# Patient Record
Sex: FEMALE | Race: WHITE | URBAN - METROPOLITAN AREA
[De-identification: names, ages, dates, MRNs, and addresses within clinical notes are randomized per-mention and may not be internally consistent; named-entity substitution may affect disease eponyms.]

---

## 2017-05-18 ENCOUNTER — APPOINTMENT (RX ONLY)
Dept: URBAN - METROPOLITAN AREA CLINIC 80 | Facility: CLINIC | Age: 22
Setting detail: DERMATOLOGY
End: 2017-05-18

## 2017-05-18 VITALS — WEIGHT: 140 LBS | HEIGHT: 55 IN

## 2017-05-18 DIAGNOSIS — L70.0 ACNE VULGARIS: ICD-10-CM

## 2017-05-18 DIAGNOSIS — L30.4 ERYTHEMA INTERTRIGO: ICD-10-CM

## 2017-05-18 DIAGNOSIS — D22 MELANOCYTIC NEVI: ICD-10-CM

## 2017-05-18 DIAGNOSIS — Z71.89 OTHER SPECIFIED COUNSELING: ICD-10-CM

## 2017-05-18 PROBLEM — D22.5 MELANOCYTIC NEVI OF TRUNK: Status: ACTIVE | Noted: 2017-05-18

## 2017-05-18 PROBLEM — D22.62 MELANOCYTIC NEVI OF LEFT UPPER LIMB, INCLUDING SHOULDER: Status: ACTIVE | Noted: 2017-05-18

## 2017-05-18 PROCEDURE — ? TREATMENT REGIMEN

## 2017-05-18 PROCEDURE — ? PRESCRIPTION

## 2017-05-18 PROCEDURE — 99213 OFFICE O/P EST LOW 20 MIN: CPT

## 2017-05-18 PROCEDURE — ? COUNSELING

## 2017-05-18 RX ORDER — MINOCYCLINE HYDROCHLORIDE 65 MG/1
TABLET, FILM COATED, EXTENDED RELEASE ORAL
Qty: 30 | Refills: 2 | Status: ERX | COMMUNITY
Start: 2017-05-18

## 2017-05-18 RX ORDER — TAZAROTENE 1 MG/G
AEROSOL, FOAM TOPICAL
Qty: 1 | Refills: 2 | Status: ERX | COMMUNITY
Start: 2017-05-18

## 2017-05-18 RX ORDER — CLINDAMYCIN PHOSPHATE AND BENZOYL PEROXIDE 10; 37.5 MG/G; MG/G
GEL TOPICAL
Qty: 1 | Refills: 2 | Status: ERX | COMMUNITY
Start: 2017-05-18

## 2017-05-18 RX ORDER — SODIUM SULFACETAMIDE AND SULFUR 80; 40 MG/ML; MG/ML
LOTION TOPICAL
Qty: 2 | Refills: 2 | Status: ERX

## 2017-05-18 RX ADMIN — CLINDAMYCIN PHOSPHATE AND BENZOYL PEROXIDE: 10; 37.5 GEL TOPICAL at 20:07

## 2017-05-18 RX ADMIN — TAZAROTENE: 1 AEROSOL, FOAM TOPICAL at 20:05

## 2017-05-18 RX ADMIN — MINOCYCLINE HYDROCHLORIDE: 65 TABLET, FILM COATED, EXTENDED RELEASE ORAL at 20:07

## 2017-05-18 ASSESSMENT — LOCATION ZONE DERM
LOCATION ZONE: TRUNK
LOCATION ZONE: ARM
LOCATION ZONE: FACE

## 2017-05-18 ASSESSMENT — LOCATION SIMPLE DESCRIPTION DERM
LOCATION SIMPLE: LEFT FOREHEAD
LOCATION SIMPLE: CHEST
LOCATION SIMPLE: ABDOMEN
LOCATION SIMPLE: LEFT SHOULDER

## 2017-05-18 ASSESSMENT — LOCATION DETAILED DESCRIPTION DERM
LOCATION DETAILED: LOWER STERNUM
LOCATION DETAILED: LEFT POSTERIOR SHOULDER
LOCATION DETAILED: RIGHT LATERAL ABDOMEN
LOCATION DETAILED: LEFT INFERIOR MEDIAL FOREHEAD

## 2017-05-18 NOTE — PROCEDURE: TREATMENT REGIMEN
Discontinue Regimen: Finacea foam QD and Acticlate 150 mg QD
Plan: Patient is moving to [place] for school--will call for any concerns if she is unable to follow up before holidays.
Samples Given: Onexton
Detail Level: Zone
Initiate Treatment: Fabior Foam and Onexton
Continue Regimen: Sulfacleanse
Continue Regimen: Alcortin as needed

## 2017-12-20 RX ORDER — CLINDAMYCIN PHOSPHATE AND BENZOYL PEROXIDE 10; 37.5 MG/G; MG/G
GEL TOPICAL
Qty: 1 | Refills: 2 | Status: ERX

## 2018-02-02 ENCOUNTER — RX ONLY (OUTPATIENT)
Age: 23
Setting detail: RX ONLY
End: 2018-02-02

## 2018-02-02 RX ORDER — VALACYCLOVIR HYDROCHLORIDE 1 G/1
TABLET, FILM COATED ORAL
Qty: 4 | Refills: 0 | Status: ERX | COMMUNITY
Start: 2018-02-02

## 2018-05-25 RX ORDER — SODIUM SULFACETAMIDE AND SULFUR 80; 40 MG/ML; MG/ML
LOTION TOPICAL
Qty: 2 | Refills: 0 | Status: ERX

## 2018-05-25 RX ORDER — CLINDAMYCIN PHOSPHATE AND BENZOYL PEROXIDE 10; 37.5 MG/G; MG/G
GEL TOPICAL
Qty: 1 | Refills: 0 | Status: ERX

## 2019-05-31 PROBLEM — Z00.00 ENCOUNTER FOR PREVENTIVE HEALTH EXAMINATION: Status: ACTIVE | Noted: 2019-05-31

## 2019-06-03 ENCOUNTER — APPOINTMENT (OUTPATIENT)
Dept: OTOLARYNGOLOGY | Facility: CLINIC | Age: 24
End: 2019-06-03
Payer: COMMERCIAL

## 2019-06-03 VITALS
BODY MASS INDEX: 23.32 KG/M2 | HEART RATE: 95 BPM | DIASTOLIC BLOOD PRESSURE: 86 MMHG | WEIGHT: 140 LBS | HEIGHT: 65 IN | SYSTOLIC BLOOD PRESSURE: 127 MMHG

## 2019-06-03 DIAGNOSIS — J34.9 UNSPECIFIED DISORDER OF NOSE AND NASAL SINUSES: ICD-10-CM

## 2019-06-03 DIAGNOSIS — Z80.9 FAMILY HISTORY OF MALIGNANT NEOPLASM, UNSPECIFIED: ICD-10-CM

## 2019-06-03 DIAGNOSIS — Z87.09 PERSONAL HISTORY OF OTHER DISEASES OF THE RESPIRATORY SYSTEM: ICD-10-CM

## 2019-06-03 DIAGNOSIS — Z87.01 PERSONAL HISTORY OF PNEUMONIA (RECURRENT): ICD-10-CM

## 2019-06-03 DIAGNOSIS — J45.909 UNSPECIFIED ASTHMA, UNCOMPLICATED: ICD-10-CM

## 2019-06-03 PROCEDURE — 31231 NASAL ENDOSCOPY DX: CPT

## 2019-06-03 PROCEDURE — 99203 OFFICE O/P NEW LOW 30 MIN: CPT | Mod: 25

## 2019-06-03 NOTE — ASSESSMENT
[FreeTextEntry1] : She has a long history of chronic sinusitis. She has recurrent sinus infections. She has been sick almost every other month since September. She was sick twice in May. She is currently on antibiotics for another infection. She just finished a course of steroids. She had findings consistent with a sinus infection on nasal endoscopy. A culture was taken. She has allergies. She saw her allergist and started allergy evaluation.  She completed the testing for food allergies. 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 She does have mild erythema on the laryngeal surface of the epiglottis. She may also suffer from reflux. There is mild interarytenoid edema that suggests this. There were no vocal cord lesions.\par \par PLAN\par \par -findings and management options discussed in detail with the patient. \par -Nasal saline rinses and antihistamine/decongestant as needed\par -she was asked to try a nasal steroid spray again.\par -she was asked to call for the results of the culture in 5-7 days. Because she does not feel that the current antibiotic is helping, I am going to switch her to doxycycline. We may change the antibiotic depending on the culture results.\par -I have asked her to complete the allergy evaluation\par -I will send her for a CT scan of the sinuses. I would like the infection to improve first but will send her even if she is not better when she returns. \par -Reflux precautions and medication (OTC zantac) as needed\par -hydration, humidification \par -good vocal hygiene. \par -speech therapy evaluation since she is a singer. \par -I asked her to consider an audiogram because she complained of fullness in her ears. she deferred today\par -follow up 1-2 weeks. \par -call and return earlier if any concerns. \par

## 2019-06-03 NOTE — HISTORY OF PRESENT ILLNESS
[de-identified] : PAPO CHACON is a 24 year  patient with a history of chronic sinusitis.  She has been sick twice in the past month. She has a long history of recurrent sinus infections.  She says they have been getting worse.  She was treated for an infection with cefdinir in eary May.  She improved but then had recurrent symptoms last week. She was started on amoxicillin and prednisone. She said a strep test as needed.  She has a cough, nasal congestion, nasal obstruction.  She typically does not have a cough when she is sick. 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 She has voice change now. She has no throat pain or dysphagia. she denies reflux. she started allergy testing but only completed the testing for food allergies. She has tried nasal steroid sprays, decongestants and antihistamines. The medications are not helping much.  She finished the steroids yesterday. She was also on steroids about 2 weeks ago.\par \par History of frequent or recurrent sinus infections: this year, she has been sick every other month since September. She moved to [place] in September.  \par \par Allergy testing:  food allergies - soy, almonds\par Nasal/sinus surgery:  no\par Nasal trauma: no\par \par Pets at home: no\par Secondhand smoke exposure: no\par \par She had mono in the past. \par \par Sinus questionnaire was reviewed\par \par

## 2019-06-10 LAB — BACTERIA FLD CULT: NORMAL

## 2019-06-26 ENCOUNTER — APPOINTMENT (OUTPATIENT)
Dept: OTOLARYNGOLOGY | Facility: CLINIC | Age: 24
End: 2019-06-26
Payer: COMMERCIAL

## 2019-06-26 VITALS
DIASTOLIC BLOOD PRESSURE: 81 MMHG | SYSTOLIC BLOOD PRESSURE: 128 MMHG | BODY MASS INDEX: 23.32 KG/M2 | HEART RATE: 86 BPM | WEIGHT: 140 LBS | HEIGHT: 65 IN

## 2019-06-26 DIAGNOSIS — H93.293 OTHER ABNORMAL AUDITORY PERCEPTIONS, BILATERAL: ICD-10-CM

## 2019-06-26 DIAGNOSIS — M26.609 UNSPECIFIED TEMPOROMANDIBULAR JOINT DISORDER: ICD-10-CM

## 2019-06-26 PROCEDURE — 92557 COMPREHENSIVE HEARING TEST: CPT

## 2019-06-26 PROCEDURE — 99213 OFFICE O/P EST LOW 20 MIN: CPT

## 2019-06-26 PROCEDURE — 92567 TYMPANOMETRY: CPT

## 2019-06-26 RX ORDER — PSEUDOEPHEDRINE HCL 30 MG
TABLET ORAL
Refills: 0 | Status: DISCONTINUED | COMMUNITY
End: 2019-06-26

## 2019-06-26 RX ORDER — PREDNISONE 10 MG/1
10 TABLET ORAL
Qty: 12 | Refills: 0 | Status: DISCONTINUED | COMMUNITY
Start: 2019-06-07 | End: 2019-06-26

## 2019-06-26 RX ORDER — DOXYCYCLINE 100 MG/1
100 CAPSULE ORAL TWICE DAILY
Qty: 20 | Refills: 0 | Status: DISCONTINUED | COMMUNITY
Start: 2019-06-03 | End: 2019-06-26

## 2019-06-26 RX ORDER — AMOXICILLIN 875 MG/1
TABLET, FILM COATED ORAL
Refills: 0 | Status: DISCONTINUED | COMMUNITY
End: 2019-06-26

## 2019-06-26 RX ORDER — FEXOFENADINE HCL 60 MG
CAPSULE ORAL
Refills: 0 | Status: ACTIVE | COMMUNITY

## 2019-06-26 RX ORDER — NORETHINDRONE ACETATE AND ETHINYL ESTRADIOL, ETHINYL ESTRADIOL AND FERROUS FUMARATE 1MG-10(24)
1 MG-10 MCG / KIT ORAL
Refills: 0 | Status: ACTIVE | COMMUNITY

## 2019-06-26 RX ORDER — FLUTICASONE PROPIONATE 50 MCG
SPRAY, SUSPENSION NASAL
Refills: 0 | Status: ACTIVE | COMMUNITY

## 2019-06-26 NOTE — HISTORY OF PRESENT ILLNESS
[de-identified] : 6/3/19- PAOP CHACON is a 24 year  patient with a history of chronic sinusitis.  She has been sick twice in the past month. She has a long history of recurrent sinus infections.  She says they have been getting worse.  She was treated for an infection with cefdinir in eary May.  She improved but then had recurrent symptoms last week. She was started on amoxicillin and prednisone. She said a strep test as needed.  She has a cough, nasal congestion, nasal obstruction.  She typically does not have a cough when she is sick. 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 She has voice change now. She has no throat pain or dysphagia. she denies reflux. she started allergy testing but only completed the testing for food allergies. She has tried nasal steroid sprays, decongestants and antihistamines. The medications are not helping much.  She finished the steroids yesterday. She was also on steroids about 2 weeks ago.\par \par History of frequent or recurrent sinus infections: this year, she has been sick every other month since September. She moved to [place] in September.  \par \par Allergy testing:  food allergies - soy, almonds\par Nasal/sinus surgery:  no\par Nasal trauma: no\par \par Pets at home: no\par Secondhand smoke exposure: no\par \par She had mono in the past. \par \par Sinus questionnaire was reviewed\par \par  [FreeTextEntry1] : \par \par 6/26/19- Jen Gregory is here for follow up for chronic sinusitis, recurrent infections, allergies and reflux. She is feeling better.  Culture taken at her last visit was negative.  She has mild occasional frontal sinus pressure/pain and dental pain.  She does not have nasal obstruction or nasal drainage.  She has not finished the allergy evaluation yet. She had the CT scan of her sinuses.  The scan showed narrow but patent OMCs, right middle turbinate jesse bullosa and deviated nasal septum.  There was no significant sinus inflammation. She has eustachian tube dysfunction and ear pressure. She may have TMJD

## 2019-06-26 NOTE — ASSESSMENT
[FreeTextEntry1] : She has a history of chronic sinusitis and recurrent sinus infections. She has been feeling better. Review of her CT scan could she does have a narrow long sneeze, a deviated septum, and right middle turbinate jesse bullosa.\par \par She has reflux.\par \par She has TMJ dysfunction\par \par She has eustachian tube dysfunction. Her exam and audiogram are normal\par \par PLAN\par \par -findings and management options discussed in detail with the patient. \par -Nasal saline rinses and antihistamine/decongestant as needed\par -nasal steroid spray prn\par -allergy evaluation\par -We will continue to monitor her. I would like to see her when she has a sinus infection. She could consider balloon sinuplasty and possible nasal procedures depending on how she does.\par -Reflux precautions and medication (OTC zantac) as needed\par -good vocal hygiene. \par -speech therapy evaluation  \par -good aural hygiene and noise precautions\par -annual audiogram prn\par -treatment for TMJD recommended. She was given literature\par -follow up in 3 months if she is doing well.\par -call and return earlier if any concerns or she has an infection. \par

## 2019-07-31 ENCOUNTER — APPOINTMENT (OUTPATIENT)
Dept: OTOLARYNGOLOGY | Facility: CLINIC | Age: 24
End: 2019-07-31
Payer: COMMERCIAL

## 2019-07-31 VITALS
BODY MASS INDEX: 23.32 KG/M2 | WEIGHT: 140 LBS | HEART RATE: 102 BPM | SYSTOLIC BLOOD PRESSURE: 134 MMHG | DIASTOLIC BLOOD PRESSURE: 90 MMHG | HEIGHT: 65 IN

## 2019-07-31 PROCEDURE — 99213 OFFICE O/P EST LOW 20 MIN: CPT | Mod: 25

## 2019-07-31 PROCEDURE — 31575 DIAGNOSTIC LARYNGOSCOPY: CPT

## 2019-07-31 NOTE — ASSESSMENT
[FreeTextEntry1] : She does have recurrent laryngitis and hoarseness. This is likely due to reflux and vocal strain. She has had high vocal load. She did feel better when she rested her voice. She also felt better on Prilosec. There were no well-defined lesions but she did have vocal cord irritation with mild irregularity of the vocal cords\par \par PLAN\par \par -findings and management options discussed in detail with the patient. \par -Nasal saline rinses and antihistamine/decongestant as needed\par -nasal steroid spray prn \par - Hydration, humidification, voice rest recommended. The patient was asked to avoid yelling, whispering, singing, throat clearing, and coughing\par - Complete voice rest for one week recommended\par - The patient will be placed on a short burst of steroids. she tolerates the medication well. \par - Reflux precautions and elevation of the head of bed at night\par - zantac bid\par -speech therapy evaluation recommended\par -I would like to see her back in one to 2 weeks. She would like to sing in the near future. 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

## 2019-07-31 NOTE — HISTORY OF PRESENT ILLNESS
[de-identified] : 6/3/19- PAPO CHACON is a 24 year  patient with a history of chronic sinusitis.  She has been sick twice in the past month. She has a long history of recurrent sinus infections.  She says they have been getting worse.  She was treated for an infection with cefdinir in eary May.  She improved but then had recurrent symptoms last week. She was started on amoxicillin and prednisone. She said a strep test as needed.  She has a cough, nasal congestion, nasal obstruction.  She typically does not have a cough when she is sick. 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 She has voice change now. She has no throat pain or dysphagia. she denies reflux. she started allergy testing but only completed the testing for food allergies. She has tried nasal steroid sprays, decongestants and antihistamines. The medications are not helping much.  She finished the steroids yesterday. She was also on steroids about 2 weeks ago.\par \par History of frequent or recurrent sinus infections: this year, she has been sick every other month since September. She moved to [place] in September.  \par \par Allergy testing:  food allergies - soy, almonds\par Nasal/sinus surgery:  no\par Nasal trauma: no\par \par Pets at home: no\par Secondhand smoke exposure: no\par \par She had mono in the past. \par \par Sinus questionnaire was reviewed\par \par  [FreeTextEntry1] : \par \par 6/26/19- Jen Gregory is here for follow up for chronic sinusitis, recurrent infections, allergies and reflux. She is feeling better.  Culture taken at her last visit was negative.  She has mild occasional frontal sinus pressure/pain and dental pain.  She does not have nasal obstruction or nasal drainage.  She has not finished the allergy evaluation yet. She had the CT scan of her sinuses.  The scan showed narrow but patent OMCs, right middle turbinate jesse bullosa and deviated nasal septum.  There was no significant sinus inflammation. She has eustachian tube dysfunction and ear pressure. She may have TMJD\par \par 7/31/19- Jen Gregory is here for intermittent loss of voice and hoarseness for the about 3 weeks. She does feel better if she rests her voice. She has mild discomfort and throat clearing. She is not coughing.  She was not sick.  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 She tried prilosec and it may have helped. She is not having a lot of nasal or sinus symptoms.

## 2019-08-06 ENCOUNTER — APPOINTMENT (OUTPATIENT)
Dept: OTOLARYNGOLOGY | Facility: CLINIC | Age: 24
End: 2019-08-06
Payer: COMMERCIAL

## 2019-08-06 VITALS
WEIGHT: 140 LBS | HEART RATE: 87 BPM | DIASTOLIC BLOOD PRESSURE: 77 MMHG | HEIGHT: 65 IN | BODY MASS INDEX: 23.32 KG/M2 | SYSTOLIC BLOOD PRESSURE: 117 MMHG

## 2019-08-06 PROCEDURE — 31575 DIAGNOSTIC LARYNGOSCOPY: CPT

## 2019-08-06 PROCEDURE — 99213 OFFICE O/P EST LOW 20 MIN: CPT | Mod: 25

## 2019-08-06 RX ORDER — PREDNISONE 10 MG/1
10 TABLET ORAL
Qty: 12 | Refills: 0 | Status: DISCONTINUED | COMMUNITY
Start: 2019-07-31 | End: 2019-08-06

## 2019-08-06 NOTE — ASSESSMENT
[FreeTextEntry1] : She has resolving laryngitis and hoarseness. Her exam was significantly better. There was no vocal cord irregularity or inflammation. She does have mild throat and neck discomfort. A culture was sent to rule out a bacterial infection. She may have muscle strain. She is on Zantac for reflux. there was no obvious sinus infection on nasal endoscopy\par \par PLAN\par \par -findings and management options discussed in detail with the patient. \par -Nasal saline rinses and antihistamine/decongestant as needed\par -nasal steroid spray prn \par - Hydration, humidification, voice rest as needed\par - She said she will be singing today. she will sing an easy song and says it will only be for about 30 seconds. I cautioned her against singing if she has any  trouble. \par - Reflux precautions and elevation of the head of bed at night\par - continue zantac bid. she may try to wean herself off it as she feels better and take it prn\par - GI evaluation recommended\par - she will call for the culture result. \par -speech therapy evaluation recommended\par -follow up in 3 months if doing well. \par -Call and return earlier if any concerns

## 2019-08-06 NOTE — HISTORY OF PRESENT ILLNESS
[de-identified] : 6/3/19- PAPO CHACON is a 24 year  patient with a history of chronic sinusitis.  She has been sick twice in the past month. She has a long history of recurrent sinus infections.  She says they have been getting worse.  She was treated for an infection with cefdinir in eary May.  She improved but then had recurrent symptoms last week. She was started on amoxicillin and prednisone. She said a strep test as needed.  She has a cough, nasal congestion, nasal obstruction.  She typically does not have a cough when she is sick. 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 She has voice change now. She has no throat pain or dysphagia. she denies reflux. she started allergy testing but only completed the testing for food allergies. She has tried nasal steroid sprays, decongestants and antihistamines. The medications are not helping much.  She finished the steroids yesterday. She was also on steroids about 2 weeks ago.\par \par History of frequent or recurrent sinus infections: this year, she has been sick every other month since September. She moved to [place] in September.  \par \par Allergy testing:  food allergies - soy, almonds\par Nasal/sinus surgery:  no\par Nasal trauma: no\par \par Pets at home: no\par Secondhand smoke exposure: no\par \par She had mono in the past. \par \par Sinus questionnaire was reviewed\par \par  [FreeTextEntry1] : \par \par 6/26/19Jarvis Gregory is here for follow up for chronic sinusitis, recurrent infections, allergies and reflux. She is feeling better.  Culture taken at her last visit was negative.  She has mild occasional frontal sinus pressure/pain and dental pain.  She does not have nasal obstruction or nasal drainage.  She has not finished the allergy evaluation yet. She had the CT scan of her sinuses.  The scan showed narrow but patent OMCs, right middle turbinate jesse bullosa and deviated nasal septum.  There was no significant sinus inflammation. She has eustachian tube dysfunction and ear pressure. She may have TMJD\par \par 7/31/19- Jen Gregory is here for intermittent loss of voice and hoarseness for the about 3 weeks. She does feel better if she rests her voice. She has mild discomfort and throat clearing. She is not coughing.  She was not sick.  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 She tried prilosec and it may have helped. She is not having a lot of nasal or sinus symptoms. \par \par 8/6/19- Jen Gregory Is here for followup for laryngitis and hoarseness. She took the oral steroids. She has been on Zantac for reflux. She also rested her voice. Her voice has has improved significantly. She does have some anterior neck discomfort and throat discomfort but the voice is clear.

## 2019-08-12 LAB — BACTERIA THROAT CULT: ABNORMAL

## 2019-08-16 ENCOUNTER — TRANSCRIPTION ENCOUNTER (OUTPATIENT)
Age: 24
End: 2019-08-16

## 2019-09-30 ENCOUNTER — APPOINTMENT (OUTPATIENT)
Dept: OTOLARYNGOLOGY | Facility: CLINIC | Age: 24
End: 2019-09-30
Payer: COMMERCIAL

## 2019-09-30 DIAGNOSIS — J34.89 OTHER SPECIFIED DISORDERS OF NOSE AND NASAL SINUSES: ICD-10-CM

## 2019-09-30 PROCEDURE — 99213 OFFICE O/P EST LOW 20 MIN: CPT

## 2019-09-30 RX ORDER — CEFUROXIME AXETIL 250 MG/1
250 TABLET ORAL
Qty: 14 | Refills: 0 | Status: COMPLETED | COMMUNITY
Start: 2019-08-09 | End: 2019-09-30

## 2019-09-30 RX ORDER — FAMOTIDINE 20 MG/1
20 TABLET, FILM COATED ORAL
Qty: 60 | Refills: 1 | Status: ACTIVE | COMMUNITY
Start: 2019-09-30 | End: 1900-01-01

## 2019-09-30 NOTE — HISTORY OF PRESENT ILLNESS
[de-identified] : 6/3/19- PAPO CHACON is a 24 year  patient with a history of chronic sinusitis.  She has been sick twice in the past month. She has a long history of recurrent sinus infections.  She says they have been getting worse.  She was treated for an infection with cefdinir in eary May.  She improved but then had recurrent symptoms last week. She was started on amoxicillin and prednisone. She said a strep test as needed.  She has a cough, nasal congestion, nasal obstruction.  She typically does not have a cough when she is sick. 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 She has voice change now. She has no throat pain or dysphagia. she denies reflux. she started allergy testing but only completed the testing for food allergies. She has tried nasal steroid sprays, decongestants and antihistamines. The medications are not helping much.  She finished the steroids yesterday. She was also on steroids about 2 weeks ago.\par \par History of frequent or recurrent sinus infections: this year, she has been sick every other month since September. She moved to [place] in September.  \par \par Allergy testing:  food allergies - soy, almonds\par Nasal/sinus surgery:  no\par Nasal trauma: no\par \par Pets at home: no\par Secondhand smoke exposure: no\par \par She had mono in the past. \par \par Sinus questionnaire was reviewed\par \par  [FreeTextEntry1] : \par \par 6/26/19Jarvis Gregory is here for follow up for chronic sinusitis, recurrent infections, allergies and reflux. She is feeling better.  Culture taken at her last visit was negative.  She has mild occasional frontal sinus pressure/pain and dental pain.  She does not have nasal obstruction or nasal drainage.  She has not finished the allergy evaluation yet. She had the CT scan of her sinuses.  The scan showed narrow but patent OMCs, right middle turbinate jesse bullosa and deviated nasal septum.  There was no significant sinus inflammation. She has eustachian tube dysfunction and ear pressure. She may have TMJD\par \par 7/31/19- Jen Gregory is here for intermittent loss of voice and hoarseness for the about 3 weeks. She does feel better if she rests her voice. She has mild discomfort and throat clearing. She is not coughing.  She was not sick.  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 She tried prilosec and it may have helped. She is not having a lot of nasal or sinus symptoms. \par \par 8/6/19- Jen Gregory Is here for followup for laryngitis and hoarseness. She took the oral steroids. She has been on Zantac for reflux. She also rested her voice. Her voice has has improved significantly. She does have some anterior neck discomfort and throat discomfort but the voice is clear.\par \par 9/30/19Jarvis Gregory is here for follow up for chronic sinusitis, reflux and laryngitis.  She had a positive throat culture for Group B strep at her last visit. She was treated with Ceftin. She is doing well. Her voice has been good. She has no throat pain, dysphagia, or cough. She does have a sinus headache. She does not have nasal congestion or obstruction. She does not feel that she has a sinus infection. She has not yet seen the gastroenterologist or gone for speech therapy evaluation. She is taking Zantac twice a day.  She also uses Flonase although that occasionally causes headaches. She takes an antihistamine.

## 2019-09-30 NOTE — ASSESSMENT
[FreeTextEntry1] : She has been doing well with regard to her voice and throat symptoms.\par \par She has a mild sinus headache but does not feel that she has a sinus infection.\par \par PLAN\par \par -findings and management options discussed in detail with the patient. \par -Nasal saline rinses and antihistamine/decongestant as needed\par -nasal steroid spray prn. she may try another brand if she finds that flonase causes her to have a headache\par -I asked her to consider finishing the allergy work up. \par -we again discussed possible sinus and nasal procedures. She is going to hold off for now.  She was cautioned about the very small risks of voice change with surgery. She could consider balloon sinuplasty if she continues to have recurrent sinus infections as it is unlikely to affect her singing. \par - Hydration, humidification and good vocal hygiene.\par - Reflux precautions and elevation of the head of bed at night\par - I am switching her to pepcid given the recent concerns regarding zantac. She may  try to wean herself off it to take it as needed. \par - GI evaluation recommended to rule out esophageal changes \par -speech therapy evaluation recommended\par -consider neurology evaluation if she continues to have headaches that are not due to a sinus infection\par -she deferred repeat NE today since she did not feel she had a sinus infection. If the headaches persist, she will return for it. \par -follow up in 3 months if doing well. \par -Call and return earlier if any concerns

## 2019-10-11 ENCOUNTER — TRANSCRIPTION ENCOUNTER (OUTPATIENT)
Age: 24
End: 2019-10-11

## 2019-10-28 ENCOUNTER — APPOINTMENT (OUTPATIENT)
Dept: ORTHOPEDIC SURGERY | Facility: CLINIC | Age: 24
End: 2019-10-28
Payer: COMMERCIAL

## 2019-10-28 VITALS — WEIGHT: 140 LBS | HEIGHT: 65 IN | BODY MASS INDEX: 23.32 KG/M2

## 2019-10-28 DIAGNOSIS — M25.511 PAIN IN RIGHT SHOULDER: ICD-10-CM

## 2019-10-28 PROCEDURE — 99203 OFFICE O/P NEW LOW 30 MIN: CPT

## 2019-10-28 PROCEDURE — 73030 X-RAY EXAM OF SHOULDER: CPT | Mod: RT

## 2019-10-28 NOTE — PHYSICAL EXAM
[de-identified] : PHYSICAL EXAM  RIGHT  SHOULDER\par \par RHD\par \par \par SCAPULAR PROTRACTION\par AROM 140 / 140 / 90 / 30\par TENDER: - GH JOINT \par \par SPECIAL TESTING :\par IMPINGEMENT SIGNS NEG \par \par \par APPREHENSION AND SUPPRESSION - POSITIVE \par \par RC STRENGTH TESTING \par SS:  5/5\par SUB 5/5\par IS     5/5\par BICEPS  5/5\par \par SENSATION  - GROSSLY INTACT\par \par \par  [de-identified] : RIGHT SHOULDER XRAY -  \par NO OBVIOUS FRACTURE , NO SIGNIFICANT OSTEOARTHRITIS, SEPARATION OR DISLOCATION \par TYPE 2B ACROMION \par CSA= 36\par

## 2019-10-28 NOTE — HISTORY OF PRESENT ILLNESS
[de-identified] : RIGHT SHOULDER\par 10/21/2019\par DANCING- SOMETHING CAME OUT OF PLACE AND WENT BACK IN\par FIRST OCCURRENCE  - \par PAIN LEVEL 5/10\par LOCALIZED PAIN\par ACHY\par BETTER WITH REST, HEAT, ICE, MEDICATION\par WORSE WITH LIFTING, ROM\par POPPING

## 2019-10-28 NOTE — DISCUSSION/SUMMARY
[de-identified] : CXAREFUL ACTIVITY \par ELBOWS AND FOREARMS CANNOT GO BEHIND PLANE OF BODY\par COLD PACKS AND IBUPROFEN 600 TID\par \par PT 2 X 4 WEEKS\par DAILY HOME EXERCISES \par RE-EVAL IN 6 WEEKS\par

## 2019-11-25 ENCOUNTER — APPOINTMENT (OUTPATIENT)
Dept: OTOLARYNGOLOGY | Facility: CLINIC | Age: 24
End: 2019-11-25
Payer: COMMERCIAL

## 2019-11-25 DIAGNOSIS — K21.9 GASTRO-ESOPHAGEAL REFLUX DISEASE W/OUT ESOPHAGITIS: ICD-10-CM

## 2019-11-25 DIAGNOSIS — J06.9 ACUTE UPPER RESPIRATORY INFECTION, UNSPECIFIED: ICD-10-CM

## 2019-11-25 DIAGNOSIS — Z91.09 OTHER ALLERGY STATUS, OTHER THAN TO DRUGS AND BIOLOGICAL SUBSTANCES: ICD-10-CM

## 2019-11-25 DIAGNOSIS — J32.4 CHRONIC PANSINUSITIS: ICD-10-CM

## 2019-11-25 DIAGNOSIS — J34.2 DEVIATED NASAL SEPTUM: ICD-10-CM

## 2019-11-25 DIAGNOSIS — R49.0 DYSPHONIA: ICD-10-CM

## 2019-11-25 PROCEDURE — 31575 DIAGNOSTIC LARYNGOSCOPY: CPT

## 2019-11-25 PROCEDURE — 99213 OFFICE O/P EST LOW 20 MIN: CPT | Mod: 25

## 2019-11-25 RX ORDER — RANITIDINE 150 MG/1
150 TABLET ORAL
Qty: 60 | Refills: 3 | Status: COMPLETED | COMMUNITY
Start: 2019-07-31 | End: 2019-11-25

## 2019-11-25 NOTE — ASSESSMENT
[FreeTextEntry1] : She has acute upper respiratory tract infection with recurrent laryngitis. She has mild edema and erythema of the vocal cords. She could be developing a small nodule on the right anterior true vocal cord and we will need to monitor this closely.. A culture was sent to a rule out bacterial infection. There was no purulent drainage on nasal endoscopy.\par \par PLAN\par \par -findings and management options discussed in detail with the patient. \par -Nasal saline rinses and antihistamine/decongestant as needed\par -nasal steroid spray prn.\par -she was asked to call Wednesday for the culture result. If it is positive, I will place her on antibiotics. We can also consider a course if her symptoms are worsening\par -I recommended that she not fly with a sinus infection as she could suffer complications. If she must fly, she may use afrin 30 minutes prior to take off and landing. She may also take a decongestant. \par -she may consider a burst of prednisone for the laryngitis. She does not want to take steroids at this time\par -hydration and humidification\par -voice rest. She was told to not sing if she has hoarseness as she could injure her vocal cords\par -avoid coughing and throat clearing\par -I again asked her to finish the allergy work up. \par -We again discussed whether or not sinus procedures would be helpful. Sinus surgery would help with the recurrent sinus infections but it is difficult to say if the episodes of pharyngitis would improve. I believe there are multiple factors causing the laryngitis. She was also concerned about the affect of surgery on her singing voice. \par - Hydration, humidification and good vocal hygiene.\par - Reflux precautions and elevation of the head of bed at night\par - continue pepcid\par - GI evaluation again recommended to rule out esophageal changes \par -speech therapy evaluation again strongly recommended\par -consider neurology evaluation if she continues to have headaches that are not due to a sinus infection\par -she deferred repeat NE today since she did not feel she had a sinus infection. If the headaches persist, she will return for it. \par -she will call me Wednesday.

## 2019-11-25 NOTE — HISTORY OF PRESENT ILLNESS
[de-identified] : 6/3/19- PAPO CHACON is a 24 year  patient with a history of chronic sinusitis.  She has been sick twice in the past month. She has a long history of recurrent sinus infections.  She says they have been getting worse.  She was treated for an infection with cefdinir in eary May.  She improved but then had recurrent symptoms last week. She was started on amoxicillin and prednisone. She said a strep test as needed.  She has a cough, nasal congestion, nasal obstruction.  She typically does not have a cough when she is sick. 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 She has voice change now. She has no throat pain or dysphagia. she denies reflux. she started allergy testing but only completed the testing for food allergies. She has tried nasal steroid sprays, decongestants and antihistamines. The medications are not helping much.  She finished the steroids yesterday. She was also on steroids about 2 weeks ago.\par \par History of frequent or recurrent sinus infections: this year, she has been sick every other month since September. She moved to [place] in September.  \par \par Allergy testing:  food allergies - soy, almonds\par Nasal/sinus surgery:  no\par Nasal trauma: no\par \par Pets at home: no\par Secondhand smoke exposure: no\par \par She had mono in the past. \par \par Sinus questionnaire was reviewed\par \par  [FreeTextEntry1] : \par \par 6/26/19Jarvis Gregory is here for follow up for chronic sinusitis, recurrent infections, allergies and reflux. She is feeling better.  Culture taken at her last visit was negative.  She has mild occasional frontal sinus pressure/pain and dental pain.  She does not have nasal obstruction or nasal drainage.  She has not finished the allergy evaluation yet. She had the CT scan of her sinuses.  The scan showed narrow but patent OMCs, right middle turbinate jsese bullosa and deviated nasal septum.  There was no significant sinus inflammation. She has eustachian tube dysfunction and ear pressure. She may have TMJD\par \par 7/31/19Jarvis Gregory is here for intermittent loss of voice and hoarseness for the about 3 weeks. She does feel better if she rests her voice. She has mild discomfort and throat clearing. She is not coughing.  She was not sick.  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 She tried prilosec and it may have helped. She is not having a lot of nasal or sinus symptoms. \par \par 8/6/19Jarvis Gregory Is here for followup for laryngitis and hoarseness. She took the oral steroids. She has been on Zantac for reflux. She also rested her voice. Her voice has has improved significantly. She does have some anterior neck discomfort and throat discomfort but the voice is clear.\par \par 9/30/19Jarvis Gregory is here for follow up for chronic sinusitis, reflux and laryngitis.  She had a positive throat culture for Group B strep at her last visit. She was treated with Ceftin. She is doing well. Her voice has been good. She has no throat pain, dysphagia, or cough. She does have a sinus headache. She does not have nasal congestion or obstruction. She does not feel that she has a sinus infection. She has not yet seen the gastroenterologist or gone for speech therapy evaluation. She is taking Zantac twice a day.  She also uses Flonase although that occasionally causes headaches. She takes an antihistamine.\par \par 11/25/19Jarvis Gregory is here for a possible sinus infection. 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 She developed dysphagia and throat pain 3 days ago. She also noticed blood from the left ear on Saturday. She did not use a cotton swab.  She developed laryngitis yesterday. She still has a raspy voice. She has nasal congestion, nasal drainage, and a postnasal drip. She said the shows is in an environment that is bad for allergies.  She is on medication for reflux. She is not yet followed through with the speech therapy evaluation, completion of the allergy evaluation or GI evaluation. She uses a nasal steroid spray as needed. She is trying to rest her voice. She will also be flying later this week.

## 2019-12-02 ENCOUNTER — APPOINTMENT (OUTPATIENT)
Dept: OTOLARYNGOLOGY | Facility: CLINIC | Age: 24
End: 2019-12-02

## 2019-12-02 LAB — BACTERIA THROAT CULT: NORMAL

## 2019-12-09 ENCOUNTER — APPOINTMENT (OUTPATIENT)
Dept: ORTHOPEDIC SURGERY | Facility: CLINIC | Age: 24
End: 2019-12-09
Payer: COMMERCIAL

## 2019-12-09 VITALS — BODY MASS INDEX: 23.32 KG/M2 | WEIGHT: 140 LBS | HEIGHT: 65 IN

## 2019-12-09 DIAGNOSIS — S43.001A UNSPECIFIED SUBLUXATION OF RIGHT SHOULDER JOINT, INITIAL ENCOUNTER: ICD-10-CM

## 2019-12-09 PROCEDURE — 99213 OFFICE O/P EST LOW 20 MIN: CPT

## 2019-12-09 NOTE — PHYSICAL EXAM
[de-identified] : PHYSICAL EXAM  RIGHT  SHOULDER\par \par RHD\par \par SCAPULAR PROTRACTION\par AROM 140 / 140 / 90 / 30\par TENDER: - GH JOINT \par \par SPECIAL TESTING :\par IMPINGEMENT SIGNS NEG \par \par \par APPREHENSION AND SUPPRESSION - POSITIVE \par \par RC STRENGTH TESTING \par SS:  5/5\par SUB 5/5\par IS     5/5\par BICEPS  5/5\par \par SENSATION  - GROSSLY INTACT\par \par \par

## 2019-12-09 NOTE — HISTORY OF PRESENT ILLNESS
[de-identified] : RIGHT SHOULDER\par FOLLOW UP\par IMPROVING, 80%\par SORENESS- WITH OVERUSE\par P.T. 2X WEEK, HELPING A LOT

## 2020-01-13 ENCOUNTER — TRANSCRIPTION ENCOUNTER (OUTPATIENT)
Age: 25
End: 2020-01-13

## 2020-01-21 ENCOUNTER — TRANSCRIPTION ENCOUNTER (OUTPATIENT)
Age: 25
End: 2020-01-21

## 2020-01-21 RX ORDER — FAMOTIDINE 20 MG/1
20 TABLET, FILM COATED ORAL
Qty: 60 | Refills: 1 | Status: ACTIVE | COMMUNITY
Start: 2020-01-21 | End: 1900-01-01

## 2020-12-21 PROBLEM — Z87.09 HISTORY OF ACUTE SINUSITIS: Status: RESOLVED | Noted: 2019-06-03 | Resolved: 2020-12-21

## 2020-12-21 PROBLEM — J06.9 ACUTE URI: Status: RESOLVED | Noted: 2019-11-25 | Resolved: 2020-12-21
